# Patient Record
Sex: MALE | Race: WHITE | NOT HISPANIC OR LATINO
[De-identification: names, ages, dates, MRNs, and addresses within clinical notes are randomized per-mention and may not be internally consistent; named-entity substitution may affect disease eponyms.]

---

## 2020-05-13 PROBLEM — Z00.00 ENCOUNTER FOR PREVENTIVE HEALTH EXAMINATION: Status: ACTIVE | Noted: 2020-05-13

## 2020-06-08 ENCOUNTER — APPOINTMENT (OUTPATIENT)
Dept: POPULATION HEALTH | Facility: CLINIC | Age: 65
End: 2020-06-08
Payer: COMMERCIAL

## 2020-06-08 DIAGNOSIS — M67.431 GANGLION, RIGHT WRIST: ICD-10-CM

## 2020-06-08 DIAGNOSIS — Z83.3 FAMILY HISTORY OF DIABETES MELLITUS: ICD-10-CM

## 2020-06-08 DIAGNOSIS — Z82.49 FAMILY HISTORY OF ISCHEMIC HEART DISEASE AND OTHER DISEASES OF THE CIRCULATORY SYSTEM: ICD-10-CM

## 2020-06-08 DIAGNOSIS — I10 ESSENTIAL (PRIMARY) HYPERTENSION: ICD-10-CM

## 2020-06-08 DIAGNOSIS — Z77.098 CONTACT WITH AND (SUSPECTED) EXPOSURE TO OTHER HAZARDOUS, CHIEFLY NONMEDICINAL, CHEMICALS: ICD-10-CM

## 2020-06-08 DIAGNOSIS — N28.1 CYST OF KIDNEY, ACQUIRED: ICD-10-CM

## 2020-06-08 DIAGNOSIS — E04.1 NONTOXIC SINGLE THYROID NODULE: ICD-10-CM

## 2020-06-08 DIAGNOSIS — Z87.891 PERSONAL HISTORY OF NICOTINE DEPENDENCE: ICD-10-CM

## 2020-06-08 DIAGNOSIS — M67.432 GANGLION, RIGHT WRIST: ICD-10-CM

## 2020-06-08 DIAGNOSIS — Z13.88 ENCOUNTER FOR SCREENING FOR DISORDER DUE TO EXPOSURE TO CONTAMINANTS: ICD-10-CM

## 2020-06-08 DIAGNOSIS — E78.00 PURE HYPERCHOLESTEROLEMIA, UNSPECIFIED: ICD-10-CM

## 2020-06-08 DIAGNOSIS — Z72.89 OTHER PROBLEMS RELATED TO LIFESTYLE: ICD-10-CM

## 2020-06-08 PROCEDURE — 99201 OFFICE OUTPATIENT NEW 10 MINUTES: CPT | Mod: 95

## 2020-06-08 RX ORDER — ALIROCUMAB 75 MG/ML
75 INJECTION, SOLUTION SUBCUTANEOUS
Qty: 6 | Refills: 0 | Status: ACTIVE | COMMUNITY
Start: 2019-08-05

## 2020-06-08 RX ORDER — DIAZEPAM 10 MG/1
10 TABLET ORAL
Qty: 30 | Refills: 0 | Status: ACTIVE | COMMUNITY
Start: 2020-01-24

## 2020-06-08 RX ORDER — ASPIRIN 81 MG
81 TABLET, DELAYED RELEASE (ENTERIC COATED) ORAL
Refills: 0 | Status: ACTIVE | COMMUNITY

## 2020-06-08 RX ORDER — MOMETASONE FUROATE MONOHYDRATE 50 UG/1
50 SPRAY, METERED NASAL
Refills: 0 | Status: ACTIVE | COMMUNITY

## 2020-06-08 RX ORDER — IBUPROFEN 800 MG/1
800 TABLET, FILM COATED ORAL
Qty: 16 | Refills: 0 | Status: ACTIVE | COMMUNITY
Start: 2020-05-29

## 2020-06-08 RX ORDER — FEXOFENADINE HYDROCHLORIDE 180 MG/1
180 TABLET ORAL
Refills: 0 | Status: ACTIVE | COMMUNITY

## 2020-06-08 RX ORDER — AMOXICILLIN 500 MG/1
500 CAPSULE ORAL
Qty: 22 | Refills: 0 | Status: COMPLETED | COMMUNITY
Start: 2020-05-29

## 2020-06-08 NOTE — ASSESSMENT
[FreeTextEntry1] : o. on the phone, patient sounded in no difficulty breathing.\par a. 66 yo m w/ PMH of thyroid disease, hypercholesterolemia, hypertension and multiple cysts in his thyroid, kidneys and wrists. 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 he reportedly has been tested and found to have "borderline" levels of these chemicals.  more generally, he was exposed to these chemicals as a child which, is universally acknowledged, is a time of heighten vulnerability to the health harm of chemicals and pollutants. The bodies mechanism to help protect against this harm is not fully developed in children and, pound for pound, they experience higher doses than adults. By being exposed at such a young age, children have a greater amount of time to develop medical consequences of exposure to environmental toxics.\par exposure to perfluoroalkyls has been potentially associated with abnormalities of the thyroid gland, elevated cholesterol and hypertension.\par given all this, it is reasonable to conclude that exposure to perfluroalkyls may have possibly contributed to causation of these medical conditions. \par his risk for developing other conditions would persist. the contaminants vary greatly in terms of how long each are likely to stay in the body once there, from hours to decades. \par i discussed with patient about these health effects, the importance of vigilance for s/sxs of these, good f/u care with his PMD and other doctors. i further advised that yearly assessment is warranted. if/when further exposure information becomes available, the plan/guidance will be adjusted accordingly. i advised him to continue following with treating physicians.\par all questions answered. pt understands and agrees with plan.\par i spent some 15 minutes on the phone with patient.

## 2020-06-08 NOTE — REVIEW OF SYSTEMS
[Earache] : earache [Nasal Discharge] : nasal discharge [Cough] : cough [Heartburn] : heartburn [Anxiety] : anxiety [Joint Pain] : joint pain [Depression] : depression [FreeTextEntry1] : difficulty swallowing

## 2020-06-08 NOTE — PAST MEDICAL HISTORY
[FreeTextEntry1] : patient lived in the [facility] all his life. he was born there, lived and work with his grandparents and family, being involved in working in the areas where contamination with pfoa/pfos has been documented. 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 he reportedly had been tested for pfoa/pfos in the past, results of his test being reported as "borderline". he currently lives in the town [place], [place], in proximity to [place]. \par \par

## 2020-06-08 NOTE — HISTORY OF PRESENT ILLNESS
[Other Location: e.g. School (Enter Location, City,State)___] : at [unfilled], at the time of the visit. [Verbal consent obtained from patient] : the patient, [unfilled] [Medical Office: ([facility])___] : at the medical office located in  [FreeTextEntry1] : s. patient referred for evaluation of possible toxic effects of exposure to perfluoroalkyls. patient has reportedly been diagnosed with several medical conditions, including a history of multiple cysts in his thyroid gland, kidneys and wrists. he has borderline hypertension and elevated cholesterol levels. he is on regular medical care with his medical doctors for his medical conditions. currently stable.\par patient was a smoker from age 21 to age 41, he smoked some 1 pack of cigarettes per day, he also had some cigar and marijuana smoking history. \par

## 2020-06-08 NOTE — HEALTH RISK ASSESSMENT
[Patient reported colonoscopy was abnormal] : Patient reported colonoscopy was abnormal [ColonoscopyComments] : polyps [ColonoscopyDate] : 6/2018